# Patient Record
Sex: MALE | Race: WHITE | URBAN - METROPOLITAN AREA
[De-identification: names, ages, dates, MRNs, and addresses within clinical notes are randomized per-mention and may not be internally consistent; named-entity substitution may affect disease eponyms.]

---

## 2017-02-17 ENCOUNTER — TRANSFERRED RECORDS (OUTPATIENT)
Dept: HEALTH INFORMATION MANAGEMENT | Facility: CLINIC | Age: 22
End: 2017-02-17

## 2019-02-01 ENCOUNTER — NURSE TRIAGE (OUTPATIENT)
Dept: NURSING | Facility: CLINIC | Age: 24
End: 2019-02-01

## 2019-02-01 NOTE — TELEPHONE ENCOUNTER
"  Reason for Disposition    [1] MODERATE pain (e.g., interferes with normal activities, limping) AND [2] present > 3 days    Additional Information    Negative: Followed a foot injury    Negative: Diabetes    Negative: Ankle pain is main symptom    Negative: Thigh or calf pain is main symptom    Negative: Entire foot is cool or blue in comparison to other foot    Negative: Purple or black skin on foot or toe    Negative: [1] Red area or streak AND [2] fever    Negative: [1] Swollen foot AND [2] fever    Negative: Patient sounds very sick or weak to the triager    Negative: [1] SEVERE pain (e.g., excruciating, unable to do any normal activities) AND [2] not improved after 2 hours of pain medicine    Negative: [1] Looks infected (spreading redness, pus) AND [2] large red area (> 2 in. or 5 cm)    Negative: Looks like a boil, infected sore, or deep ulcer    Negative: [1] Redness of the skin AND [2] no fever    Negative: [1] Swollen foot AND [2] no fever  (Exceptions: localized bump from bunions, calluses, insect bite, sting)    Negative: Numbness in one foot (i.e., loss of sensation)    Answer Assessment - Initial Assessment Questions  1. ONSET: \"When did the pain start?\"       5 days  2. LOCATION: \"Where is the pain located?\"       Left foot  3. PAIN: \"How bad is the pain?\"    (Scale 1-10; or mild, moderate, severe)    -  MILD (1-3): doesn't interfere with normal activities     -  MODERATE (4-7): interferes with normal activities (e.g., work or school) or awakens from sleep, limping     -  SEVERE (8-10): excruciating pain, unable to do any normal activities, unable to walk      mild  4. WORK OR EXERCISE: \"Has there been any recent work or exercise that involved this part of the body?\"       no  5. CAUSE: \"What do you think is causing the foot pain?\"      ?  6. OTHER SYMPTOMS: \"Do you have any other symptoms?\" (e.g., leg pain, rash, fever, numbness)      toes twitch involuntarily, possibly a little puffy on one " "side  7. PREGNANCY: \"Is there any chance you are pregnant?\" \"When was your last menstrual period?\"     no    Protocols used: FOOT PAIN-ADULT-AH    "

## 2019-02-01 NOTE — PROGRESS NOTES
"  SUBJECTIVE:   Prince Delgado is a 23 year old male who presents to clinic today for the following health issues:      HPI     Concern - Left Foot Problem  Onset: x 10 days    Description:   Numbness/Tingling/Twitching Feeling in Left Foot    Intensity: mild    Progression of Symptoms:  worsening    Accompanying Signs & Symptoms:  Swelling at the beginning    Previous history of similar problem:   None  Therapies Tried and outcome: Vitamin B      Problem list and histories reviewed & adjusted, as indicated.  Additional history: as documented        Patient Active Problem List   Diagnosis     Paresthesia     History reviewed. No pertinent surgical history.    Social History     Tobacco Use     Smoking status: Former Smoker     Smokeless tobacco: Never Used   Substance Use Topics     Alcohol use: No     Frequency: Never     Family History   Problem Relation Age of Onset     Multiple Sclerosis Paternal Grandfather          No current outpatient medications on file.     No Known Allergies  BP Readings from Last 3 Encounters:   02/05/19 114/62   10/12/09 121/74    Wt Readings from Last 3 Encounters:   02/05/19 74.8 kg (165 lb)   02/09/09 56.4 kg (124 lb 6.4 oz) (58 %)*   07/19/03 30.1 kg (66 lb 4 oz) (60 %)*     * Growth percentiles are based on CDC (Boys, 2-20 Years) data.                  Labs reviewed in EPIC    ROS:  Constitutional, HEENT, cardiovascular, pulmonary, gi and gu systems are negative, except as otherwise noted.    OBJECTIVE:     /62 (BP Location: Right arm, Patient Position: Chair, Cuff Size: Adult Regular)   Pulse 72   Temp 98  F (36.7  C) (Temporal)   Resp 16   Ht 1.856 m (6' 1.07\")   Wt 74.8 kg (165 lb)   SpO2 98%   BMI 21.73 kg/m    Body mass index is 21.73 kg/m .   Physical Exam   Constitutional: He appears well-developed and well-nourished.   HENT:   Head: Normocephalic and atraumatic.   Cardiovascular: Normal rate, regular rhythm, normal heart sounds and intact distal " pulses. Exam reveals no gallop and no friction rub.   No murmur heard.  Pulmonary/Chest: Effort normal and breath sounds normal.   Psychiatric: He has a normal mood and affect. His behavior is normal. Judgment and thought content normal.         Diagnostic Test Results:  none     ASSESSMENT/PLAN:     Problem List Items Addressed This Visit     Paresthesia - Primary     Etiology is not clear.  Normal pulses and sensations in bilateral lower extremities.  No neurological deficits noted on exam.  Will rule out metabolic causes like vitamin deficiencies, electrolyte abnormalities and thyroid problem.  Reassured.  Follow results and treat accordingly.  Discussed reportable signs and symptoms.  Return to clinic if symptoms progressively get worse or do not resolve in the next 6-8 weeks.         Relevant Orders    CBC with platelets and differential    Comprehensive metabolic panel    TSH with free T4 reflex    Vitamin B12      Other Visit Diagnoses     Screening for lipoid disorders        Relevant Orders    Lipid panel reflex to direct LDL Fasting         Laura Mares MD  [facility]

## 2019-02-01 NOTE — TELEPHONE ENCOUNTER
Caller says that his toes also involuntarily twitch at times.  Cecelia Parrish RN  [facility]

## 2019-02-05 ENCOUNTER — OFFICE VISIT (OUTPATIENT)
Dept: FAMILY MEDICINE | Facility: OTHER | Age: 24
End: 2019-02-05
Payer: COMMERCIAL

## 2019-02-05 VITALS
OXYGEN SATURATION: 98 % | HEART RATE: 72 BPM | HEIGHT: 73 IN | BODY MASS INDEX: 21.87 KG/M2 | RESPIRATION RATE: 16 BRPM | DIASTOLIC BLOOD PRESSURE: 62 MMHG | SYSTOLIC BLOOD PRESSURE: 114 MMHG | WEIGHT: 165 LBS | TEMPERATURE: 98 F

## 2019-02-05 DIAGNOSIS — R20.2 PARESTHESIA: Primary | ICD-10-CM

## 2019-02-05 DIAGNOSIS — Z13.220 SCREENING FOR LIPOID DISORDERS: ICD-10-CM

## 2019-02-05 PROCEDURE — 99203 OFFICE O/P NEW LOW 30 MIN: CPT | Performed by: FAMILY MEDICINE

## 2019-02-05 SDOH — HEALTH STABILITY: MENTAL HEALTH: HOW OFTEN DO YOU HAVE A DRINK CONTAINING ALCOHOL?: NEVER

## 2019-02-05 ASSESSMENT — MIFFLIN-ST. JEOR: SCORE: 1793.44

## 2019-02-05 ASSESSMENT — PAIN SCALES - GENERAL: PAINLEVEL: NO PAIN (0)

## 2019-02-05 NOTE — ASSESSMENT & PLAN NOTE
Etiology is not clear.  Normal pulses and sensations in bilateral lower extremities.  No neurological deficits noted on exam.  Will rule out metabolic causes like vitamin deficiencies, electrolyte abnormalities and thyroid problem.  Reassured.  Follow results and treat accordingly.  Discussed reportable signs and symptoms.  Return to clinic if symptoms progressively get worse or do not resolve in the next 6-8 weeks.

## 2019-02-07 ENCOUNTER — TRANSFERRED RECORDS (OUTPATIENT)
Dept: HEALTH INFORMATION MANAGEMENT | Facility: CLINIC | Age: 24
End: 2019-02-07

## 2019-02-27 ENCOUNTER — TRANSFERRED RECORDS (OUTPATIENT)
Dept: HEALTH INFORMATION MANAGEMENT | Facility: CLINIC | Age: 24
End: 2019-02-27

## 2019-03-14 ENCOUNTER — TELEPHONE (OUTPATIENT)
Dept: FAMILY MEDICINE | Facility: OTHER | Age: 24
End: 2019-03-14

## 2019-03-14 NOTE — TELEPHONE ENCOUNTER
Panel Management Review      Patient has the following on his problem list: None      Composite cancer screening  Chart review shows that this patient is due/due soon for the following None  Summary:    Patient is due/failing the following:   CBC, CMP, TSH, Vitamin B12, LDL and PHYSICAL    Action needed:   Patient needs fasting lab only appointment    Type of outreach:    Phone, spoke to patient.  Patient will call back to schedule a fasting only appointment.    Questions for provider review:    None                                                                                                                                    Gabby Dailey CMA ([facility])       Chart routed to Care Team .

## 2019-04-05 NOTE — TELEPHONE ENCOUNTER
RECORDS RECEIVED FROM: External - Aruna Yoo NP - [facility] (PCP)   DATE RECEIVED: 4/9/19   NOTES (FOR ALL VISITS) STATUS DETAILS   OFFICE NOTE from referring provider Received 2/27/19  2/7/19  1/11/19  2/17/17  1/26/15  (older encounters)   OFFICE NOTE from other specialist N/A    DISCHARGE SUMMARY from hospital N/A    DISCHARGE REPORT from the ER N/A    OPERATIVE REPORT N/A    MEDICATION LIST Received    IMAGING  (FOR ALL VISITS)     EMG N/A    EEG N/A    ECT N/A    MRI (HEAD, NECK, SPINE) Received [facility]:  MRI Brain 2/27/19  MRI Cervical Spine 2/27/19  MRI Thoracic Spine 2/27/19   CT (HEAD, NECK, SPINE) N/A      Action    Action Taken Faxed records request to [facility]

## 2019-04-08 NOTE — TELEPHONE ENCOUNTER
Imaging Received  [facility]   Image Type (x): Disc:   PACS: X   Exam Date/Name MRI Brain 2/27/19  MRI Cervical Spine 2/27/19  MRI Thoracic Spine 2/27/19 Comments: Film room notified to resolve images in PACS

## 2019-04-08 NOTE — TELEPHONE ENCOUNTER
Action    Action Taken Records received from [employer] [facility] via fax       Phone Call:     Contact Name [facility]    Outcome Images will be pushed

## 2019-04-09 ENCOUNTER — PRE VISIT (OUTPATIENT)
Dept: NEUROLOGY | Facility: CLINIC | Age: 24
End: 2019-04-09